# Patient Record
Sex: MALE | Race: BLACK OR AFRICAN AMERICAN | NOT HISPANIC OR LATINO | URBAN - METROPOLITAN AREA
[De-identification: names, ages, dates, MRNs, and addresses within clinical notes are randomized per-mention and may not be internally consistent; named-entity substitution may affect disease eponyms.]

---

## 2022-05-05 ENCOUNTER — EMERGENCY (EMERGENCY)
Facility: HOSPITAL | Age: 62
LOS: 1 days | Discharge: HOME | End: 2022-05-05
Attending: EMERGENCY MEDICINE | Admitting: EMERGENCY MEDICINE
Payer: COMMERCIAL

## 2022-05-05 VITALS
TEMPERATURE: 98 F | OXYGEN SATURATION: 94 % | SYSTOLIC BLOOD PRESSURE: 163 MMHG | DIASTOLIC BLOOD PRESSURE: 87 MMHG | HEART RATE: 76 BPM | RESPIRATION RATE: 18 BRPM

## 2022-05-05 VITALS
DIASTOLIC BLOOD PRESSURE: 83 MMHG | OXYGEN SATURATION: 94 % | SYSTOLIC BLOOD PRESSURE: 163 MMHG | RESPIRATION RATE: 18 BRPM | WEIGHT: 193.57 LBS | HEART RATE: 84 BPM | TEMPERATURE: 98 F | HEIGHT: 63 IN

## 2022-05-05 DIAGNOSIS — E11.65 TYPE 2 DIABETES MELLITUS WITH HYPERGLYCEMIA: ICD-10-CM

## 2022-05-05 DIAGNOSIS — R42 DIZZINESS AND GIDDINESS: ICD-10-CM

## 2022-05-05 DIAGNOSIS — I10 ESSENTIAL (PRIMARY) HYPERTENSION: ICD-10-CM

## 2022-05-05 LAB
ALBUMIN SERPL ELPH-MCNC: 4.4 G/DL — SIGNIFICANT CHANGE UP (ref 3.3–5)
ALP SERPL-CCNC: 101 U/L — SIGNIFICANT CHANGE UP (ref 40–120)
ALT FLD-CCNC: 22 U/L — SIGNIFICANT CHANGE UP (ref 10–45)
ANION GAP SERPL CALC-SCNC: 11 MMOL/L — SIGNIFICANT CHANGE UP (ref 5–17)
AST SERPL-CCNC: 20 U/L — SIGNIFICANT CHANGE UP (ref 10–40)
BASOPHILS # BLD AUTO: 0.04 K/UL — SIGNIFICANT CHANGE UP (ref 0–0.2)
BASOPHILS NFR BLD AUTO: 0.5 % — SIGNIFICANT CHANGE UP (ref 0–2)
BILIRUB SERPL-MCNC: 0.2 MG/DL — SIGNIFICANT CHANGE UP (ref 0.2–1.2)
BUN SERPL-MCNC: 21 MG/DL — SIGNIFICANT CHANGE UP (ref 7–23)
CALCIUM SERPL-MCNC: 10.1 MG/DL — SIGNIFICANT CHANGE UP (ref 8.4–10.5)
CHLORIDE SERPL-SCNC: 100 MMOL/L — SIGNIFICANT CHANGE UP (ref 96–108)
CO2 SERPL-SCNC: 31 MMOL/L — SIGNIFICANT CHANGE UP (ref 22–31)
CREAT SERPL-MCNC: 1.3 MG/DL — SIGNIFICANT CHANGE UP (ref 0.5–1.3)
EGFR: 62 ML/MIN/1.73M2 — SIGNIFICANT CHANGE UP
EOSINOPHIL # BLD AUTO: 0.21 K/UL — SIGNIFICANT CHANGE UP (ref 0–0.5)
EOSINOPHIL NFR BLD AUTO: 2.7 % — SIGNIFICANT CHANGE UP (ref 0–6)
GLUCOSE SERPL-MCNC: 213 MG/DL — HIGH (ref 70–99)
HCT VFR BLD CALC: 39.4 % — SIGNIFICANT CHANGE UP (ref 39–50)
HGB BLD-MCNC: 12.6 G/DL — LOW (ref 13–17)
IMM GRANULOCYTES NFR BLD AUTO: 0.4 % — SIGNIFICANT CHANGE UP (ref 0–1.5)
LYMPHOCYTES # BLD AUTO: 2.35 K/UL — SIGNIFICANT CHANGE UP (ref 1–3.3)
LYMPHOCYTES # BLD AUTO: 30.6 % — SIGNIFICANT CHANGE UP (ref 13–44)
MCHC RBC-ENTMCNC: 27.3 PG — SIGNIFICANT CHANGE UP (ref 27–34)
MCHC RBC-ENTMCNC: 32 GM/DL — SIGNIFICANT CHANGE UP (ref 32–36)
MCV RBC AUTO: 85.3 FL — SIGNIFICANT CHANGE UP (ref 80–100)
MONOCYTES # BLD AUTO: 0.63 K/UL — SIGNIFICANT CHANGE UP (ref 0–0.9)
MONOCYTES NFR BLD AUTO: 8.2 % — SIGNIFICANT CHANGE UP (ref 2–14)
NEUTROPHILS # BLD AUTO: 4.43 K/UL — SIGNIFICANT CHANGE UP (ref 1.8–7.4)
NEUTROPHILS NFR BLD AUTO: 57.6 % — SIGNIFICANT CHANGE UP (ref 43–77)
NRBC # BLD: 0 /100 WBCS — SIGNIFICANT CHANGE UP (ref 0–0)
PLATELET # BLD AUTO: 246 K/UL — SIGNIFICANT CHANGE UP (ref 150–400)
POTASSIUM SERPL-MCNC: 3.4 MMOL/L — LOW (ref 3.5–5.3)
POTASSIUM SERPL-SCNC: 3.4 MMOL/L — LOW (ref 3.5–5.3)
PROT SERPL-MCNC: 7.7 G/DL — SIGNIFICANT CHANGE UP (ref 6–8.3)
RBC # BLD: 4.62 M/UL — SIGNIFICANT CHANGE UP (ref 4.2–5.8)
RBC # FLD: 14 % — SIGNIFICANT CHANGE UP (ref 10.3–14.5)
SODIUM SERPL-SCNC: 142 MMOL/L — SIGNIFICANT CHANGE UP (ref 135–145)
WBC # BLD: 7.69 K/UL — SIGNIFICANT CHANGE UP (ref 3.8–10.5)
WBC # FLD AUTO: 7.69 K/UL — SIGNIFICANT CHANGE UP (ref 3.8–10.5)

## 2022-05-05 PROCEDURE — 85025 COMPLETE CBC W/AUTO DIFF WBC: CPT

## 2022-05-05 PROCEDURE — 82962 GLUCOSE BLOOD TEST: CPT

## 2022-05-05 PROCEDURE — 99284 EMERGENCY DEPT VISIT MOD MDM: CPT

## 2022-05-05 PROCEDURE — 99283 EMERGENCY DEPT VISIT LOW MDM: CPT

## 2022-05-05 PROCEDURE — 36415 COLL VENOUS BLD VENIPUNCTURE: CPT

## 2022-05-05 PROCEDURE — 80053 COMPREHEN METABOLIC PANEL: CPT

## 2022-05-05 RX ORDER — SODIUM CHLORIDE 9 MG/ML
1000 INJECTION INTRAMUSCULAR; INTRAVENOUS; SUBCUTANEOUS ONCE
Refills: 0 | Status: COMPLETED | OUTPATIENT
Start: 2022-05-05 | End: 2022-05-05

## 2022-05-05 RX ADMIN — SODIUM CHLORIDE 1000 MILLILITER(S): 9 INJECTION INTRAMUSCULAR; INTRAVENOUS; SUBCUTANEOUS at 14:23

## 2022-05-05 NOTE — ED PROVIDER NOTE - OBJECTIVE STATEMENT
62 y/o M with PMHx of DM and HTN, on metformin 875 BID, this morning woke up and felt lightheaded, thought sugar was low and drank a pint of OJ, went to  and sent here after FS was 300 in . FS here on arrival 180, patient with no complaints and wants to leave. No symptoms, no polyuria, no urinary frequency, no lightheadedness, no dizziness. No hx of DKA.

## 2022-05-05 NOTE — ED PROVIDER NOTE - NSFOLLOWUPINSTRUCTIONS_ED_ALL_ED_FT
Take your regularly prescribed medications.    Check your glucose at home.    Follow up with your PMD as scheduled.        Hyperglycemia      Hyperglycemia occurs when the level of sugar (glucose) in the blood is too high. Glucose is a type of sugar that provides the body's main source of energy. Certain hormones (insulin and glucagon) control the level of glucose in the blood. Insulin lowers blood glucose, and glucagon increases blood glucose. Hyperglycemia can result from not having enough insulin in the bloodstream, or from the body not responding normally to insulin.    Hyperglycemia occurs most often in people who have diabetes (diabetes mellitus), but it can happen in people who do not have diabetes. It can develop quickly, and it can be life-threatening if it causes you to become severely dehydrated (diabetic ketoacidosis or hyperglycemic hyperosmolar state). Severe hyperglycemia is a medical emergency.    For most people with diabetes, a blood glucose level above 240 mg/dL is considered hyperglycemia.      What are the causes?    If you have diabetes, hyperglycemia may be caused by:  •Medicines that increase blood glucose or affect your diabetes control.      •Getting less physical activity.      •Eating more than planned.      •Being sick or injured, having an infection, or having surgery.      •Stress.      •Not giving yourself enough insulin (if you are taking insulin).      If you have undiagnosed diabetes, this may be the reason you have hyperglycemia.    If you do not have diabetes, hyperglycemia may be caused by:•Certain medicines, including:  •Steroid medicines.      •Beta-blockers.      •Epinephrine.      •Thiazide diuretics.        •Stress.      •Having a serious illness, an infection, or surgery.      •Diseases of the pancreas.        What increases the risk?    Hyperglycemia is more likely to develop in people who have risk factors for diabetes, such as:  •Having a family member with diabetes.      •Certain conditions in which the body's disease-fighting system (immune system) attacks itself (autoimmune disorders).      •Being overweight or obese.      •Having an inactive (sedentary) lifestyle.      •Having been diagnosed with insulin resistance.      •Having a history of prediabetes, gestational diabetes, or polycystic ovarian syndrome (PCOS).        What are the signs or symptoms?    Hyperglycemia may not cause any symptoms. If you do have symptoms, they may include:  •Increased thirst.      •Needing to urinate more often than usual.      •Hunger.      •Feeling very tired.      •Blurry vision.      Other symptoms may develop if hyperglycemia gets worse, such as:  •Dry mouth.      •Abdominal pain.      •Loss of appetite.      •Fruity-smelling breath.      •Weakness.      •Unexpected weight loss.      •Tingling or numbness in the hands or feet.      •Headache.      •Cuts or bruises that are slow to heal.        How is this diagnosed?    Hyperglycemia is diagnosed with a blood test to measure your blood glucose level. This blood test is usually done while you are having symptoms. Your health care provider may also do a physical exam and review your medical history.    You may have more tests to determine the cause of your hyperglycemia, such as:  •A fasting blood glucose (FBG) test. You will not be allowed to eat (you will fast) for at least 8 hours before a blood sample is taken.      •An A1C blood test. This provides information about blood glucose control over the previous 2–3 months.    •An oral glucose tolerance test (OGTT). This measures your blood glucose at two times:  •After fasting. This is your baseline blood glucose level.      •2 hours after drinking a beverage that contains glucose.          How is this treated?    Treatment depends on the cause of your hyperglycemia. Treatment may include:  •Taking medicine to regulate your blood glucose levels. If you take insulin or other diabetes medicines, your medicine or dosage may be adjusted.      •Lifestyle changes, such as exercising more, eating healthier foods, or losing weight.      •Treating an illness or infection.      •Checking your blood glucose more often.      •Stopping or reducing steroid medicines.      If your hyperglycemia becomes severe and it results in diabetic ketoacidosis or hyperglycemic hyperosmolar state, you must be hospitalized and given IV fluids and IV insulin.      Follow these instructions at home:    General instructions     •Take over-the-counter and prescription medicines only as told by your health care provider.      • Do not use any products that contain nicotine or tobacco. These products include cigarettes, chewing tobacco, and vaping devices, such as e-cigarettes. If you need help quitting, ask your health care provider.    •If you drink alcohol: •Limit how much you have to:  •0–1 drink a day for women who are not pregnant.      •0–2 drinks a day for men.         •Know how much alcohol is in a drink. In the [place] one drink equals one 12 oz bottle of beer (355 mL), one 5 oz glass of wine (148 mL), or one 1½ oz glass of hard liquor (44 mL).        •Learn to manage stress. If you need help with this, ask your health care provider.      •Do exercises as told by your health care provider.      •Keep all follow-up visits. This is important.        Eating and drinking      •Maintain a healthy weight.      •Stay hydrated, especially when you exercise, get sick, or spend time in hot temperatures.      •Drink enough fluid to keep your urine pale yellow.        If you have diabetes:      •Know the symptoms of hyperglycemia.    •Follow your diabetes management plan as told by your health care provider. Make sure you:  •Take your insulin and medicines as told.      •Follow your exercise plan.      •Follow your meal plan. Eat on time, and do not skip meals.      •Check your blood glucose as often as told. Make sure to check your blood glucose before and after exercise. If you exercise longer or in a different way, check your blood glucose more often.      •Follow your sick day plan whenever you cannot eat or drink normally. Make this plan in advance with your health care provider.        •Share your diabetes management plan with people in your workplace, school, and household.      •Check your urine for ketones when you are ill and as told by your health care provider.      •Carry a medical alert card or wear medical alert jewelry.        Where to find more information    American Diabetes Association: www.diabetes.org      Contact a health care provider if:    •Your blood glucose is at or above 240 mg/dL (13.3 mmol/L) for 2 days in a row.      •You have problems keeping your blood glucose in your target range.      •You have frequent episodes of hyperglycemia.      •You have signs of illness, such as nausea, vomiting, or fever.        Get help right away if:    •Your blood glucose monitor reads "high" even when you are taking insulin.      •You have trouble breathing.      •You have a change in how you think, feel, or act (mental status).      •You have nausea or vomiting that does not go away.      These symptoms may represent a serious problem that is an emergency. Do not wait to see if the symptoms will go away. Get medical help right away. Call your local emergency services (911 in the [place] Do not drive yourself to the hospital.       Summary    •Hyperglycemia occurs when the level of sugar (glucose) in the blood is too high.      •Hyperglycemia can happen with or without diabetes, and severe hyperglycemia can be life-threatening.      •Hyperglycemia is diagnosed with a blood test to measure your blood glucose level. This blood test is usually done while you are having symptoms. Your health care provider may also do a physical exam and review your medical history.      •If you have diabetes, follow your diabetes management plan as told by your health care provider.      •Contact your health care provider if you have problems keeping your blood glucose in your target range.      This information is not intended to replace advice given to you by your health care provider. Make sure you discuss any questions you have with your health care provider.      Document Revised: 10/01/2021 Document Reviewed: 10/01/2021    Elsevier Patient Education © 2022 [employer]

## 2022-05-05 NOTE — ED PROVIDER NOTE - PATIENT PORTAL LINK FT
You can access the FollowMyHealth Patient Portal offered by [facility] by registering at the following website: [email or web address]. By joining [employer]’s FollowMyHealth portal, you will also be able to view your health information using other applications (apps) compatible with our system.

## 2022-05-05 NOTE — ED ADULT NURSE NOTE - OBJECTIVE STATEMENT
61y Male c/o Hyperglycemia. Pmhx HTN, DMII. Pt A&O x 4, speaking clear and in full sentences, ambulatory with steady gait. Pt reports around 1100 am started to feel lightheaded and dizzy, "I thought my blood sugar was low so I took a cup of orange juice". Pt took his Metformin around 1200 today.  Sent from  for FS of 300.  Pt currently denies HA, Dizziness, lightheadedness, unilateral weakness, changes in vision/speech, CP, N/V/D, F/C.

## 2022-05-05 NOTE — ED ADULT TRIAGE NOTE - OTHER COMPLAINTS
Patient reports symptoms started at 11am this morning. Reports hx of DM type 2. Patient denies numbness or weakness on one side of body. No facial drooping or slurred speech noted. Ambulating with steady gait. Fingerstick 188 in triage.

## 2022-05-05 NOTE — ED PROVIDER NOTE - CLINICAL SUMMARY MEDICAL DECISION MAKING FREE TEXT BOX
62 y/o M with hx DM and HTN, was lightheaded this morning and drank orange juice, went to  and sent here as blood sugar was elevated, decreasing here with  in ED. Will check labs, given 1L fluids, discharge. 60 y/o M with hx DM and HTN, was lightheaded this morning and drank orange juice, went to  and sent here as blood sugar was elevated, decreasing here with  in ED. Will check labs, given 1L fluids, discharge.    Labs ok in ED. Pt feels better after IVFs.    Understands to check glucose at home.
